# Patient Record
Sex: FEMALE | Race: OTHER
[De-identification: names, ages, dates, MRNs, and addresses within clinical notes are randomized per-mention and may not be internally consistent; named-entity substitution may affect disease eponyms.]

---

## 2020-08-29 ENCOUNTER — HOSPITAL ENCOUNTER (EMERGENCY)
Dept: HOSPITAL 12 - ER | Age: 45
Discharge: HOME | End: 2020-08-29
Payer: MEDICAID

## 2020-08-29 VITALS — BODY MASS INDEX: 29.45 KG/M2 | HEIGHT: 60 IN | WEIGHT: 150 LBS

## 2020-08-29 VITALS — DIASTOLIC BLOOD PRESSURE: 74 MMHG | SYSTOLIC BLOOD PRESSURE: 132 MMHG

## 2020-08-29 DIAGNOSIS — S43.035A: Primary | ICD-10-CM
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
## 2020-08-29 PROCEDURE — 73030 X-RAY EXAM OF SHOULDER: CPT

## 2020-08-29 PROCEDURE — G0500 MOD SEDAT ENDO SERVICE >5YRS: HCPCS

## 2020-08-29 PROCEDURE — 73020 X-RAY EXAM OF SHOULDER: CPT

## 2020-08-29 PROCEDURE — 99285 EMERGENCY DEPT VISIT HI MDM: CPT

## 2020-08-29 PROCEDURE — 99152 MOD SED SAME PHYS/QHP 5/>YRS: CPT

## 2020-08-29 PROCEDURE — 23650 CLTX SHO DSLC W/MNPJ WO ANES: CPT

## 2020-08-29 PROCEDURE — A4663 DIALYSIS BLOOD PRESSURE CUFF: HCPCS

## 2020-08-29 PROCEDURE — 96374 THER/PROPH/DIAG INJ IV PUSH: CPT

## 2020-08-29 PROCEDURE — 0RSKXZZ REPOSITION LEFT SHOULDER JOINT, EXTERNAL APPROACH: ICD-10-PCS | Performed by: EMERGENCY MEDICINE

## 2020-08-29 NOTE — NUR
Moderate Sedation for closed reduction of left shoulder dislocation. Please see 
written moderate sedation record. Ignacio Acuña RN, Ariela RN, Cooper RT at 
bedside. All doses of Propofol were prepared and adm with Dr. Paula at bedside.

## 2020-08-29 NOTE — NUR
CLOSED REDUCTION OF LEFT SHOULDER WITH PROCEDURAL SEDATION WAS PERFORMED BY DR COTO. PT TOLERATED TO PROCEDURE WITHOUT COMPLICATIONS. PT IS FULLY AVAKE NOW , 
CAN SPEAK IN FULL SENTENCES, AND MOVE ALL FOUR EXTREMITIES WITHOUT LIMITATIONS. 
GAIT IS STABLE. PT DENIES PAIN.

## 2022-09-08 ENCOUNTER — HOSPITAL ENCOUNTER (EMERGENCY)
Dept: HOSPITAL 12 - ER | Age: 47
Discharge: HOME | End: 2022-09-08
Payer: MEDICAID

## 2022-09-08 VITALS — BODY MASS INDEX: 35.34 KG/M2 | HEIGHT: 60 IN | WEIGHT: 180 LBS

## 2022-09-08 VITALS — SYSTOLIC BLOOD PRESSURE: 150 MMHG | DIASTOLIC BLOOD PRESSURE: 90 MMHG

## 2022-09-08 DIAGNOSIS — H93.19: ICD-10-CM

## 2022-09-08 DIAGNOSIS — H60.92: Primary | ICD-10-CM

## 2022-09-08 DIAGNOSIS — E66.9: ICD-10-CM

## 2022-09-08 DIAGNOSIS — H91.93: ICD-10-CM

## 2022-09-08 PROCEDURE — A4663 DIALYSIS BLOOD PRESSURE CUFF: HCPCS

## 2023-10-16 ENCOUNTER — HOSPITAL ENCOUNTER (EMERGENCY)
Dept: HOSPITAL 54 - ER | Age: 48
Discharge: HOME | End: 2023-10-16
Payer: MEDICAID

## 2023-10-16 VITALS — TEMPERATURE: 98.3 F | SYSTOLIC BLOOD PRESSURE: 120 MMHG | OXYGEN SATURATION: 97 % | DIASTOLIC BLOOD PRESSURE: 80 MMHG

## 2023-10-16 VITALS — BODY MASS INDEX: 30.21 KG/M2 | HEIGHT: 61 IN | WEIGHT: 160 LBS

## 2023-10-16 DIAGNOSIS — Z79.899: ICD-10-CM

## 2023-10-16 DIAGNOSIS — H66.92: Primary | ICD-10-CM

## 2023-10-16 DIAGNOSIS — H60.92: ICD-10-CM

## 2024-08-26 ENCOUNTER — HOSPITAL ENCOUNTER (EMERGENCY)
Dept: HOSPITAL 12 - ER | Age: 49
Discharge: HOME | End: 2024-08-26
Payer: MEDICAID

## 2024-08-26 VITALS — SYSTOLIC BLOOD PRESSURE: 141 MMHG | OXYGEN SATURATION: 99 % | DIASTOLIC BLOOD PRESSURE: 84 MMHG

## 2024-08-26 VITALS — HEIGHT: 60 IN | BODY MASS INDEX: 35.34 KG/M2 | WEIGHT: 180 LBS
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
## 2024-08-26 DIAGNOSIS — Z79.891: ICD-10-CM

## 2024-08-26 DIAGNOSIS — M25.511: ICD-10-CM

## 2024-08-26 DIAGNOSIS — Z79.1: ICD-10-CM
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
## 2024-08-26 DIAGNOSIS — S83.8X2A: Primary | ICD-10-CM

## 2024-08-26 DIAGNOSIS — M75.31: ICD-10-CM

## 2024-08-26 DIAGNOSIS — E66.9: ICD-10-CM

## 2024-08-26 DIAGNOSIS — Z79.899: ICD-10-CM
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
## 2024-08-26 PROCEDURE — A4663 DIALYSIS BLOOD PRESSURE CUFF: HCPCS

## 2024-08-26 PROCEDURE — A4606 OXYGEN PROBE USED W OXIMETER: HCPCS

## 2024-08-26 RX ADMIN — HYDROCODONE BITARTRATE AND ACETAMINOPHEN ONE TAB: 5; 325 TABLET ORAL at 19:06

## 2024-08-26 RX ADMIN — IBUPROFEN ONE MG: 600 TABLET, FILM COATED ORAL at 19:54
